# Patient Record
Sex: FEMALE | Race: WHITE | NOT HISPANIC OR LATINO | Employment: FULL TIME | URBAN - NONMETROPOLITAN AREA
[De-identification: names, ages, dates, MRNs, and addresses within clinical notes are randomized per-mention and may not be internally consistent; named-entity substitution may affect disease eponyms.]

---

## 2017-05-30 ENCOUNTER — OFFICE VISIT (OUTPATIENT)
Dept: CARDIOLOGY | Facility: CLINIC | Age: 27
End: 2017-05-30

## 2017-05-30 VITALS
WEIGHT: 169.4 LBS | HEIGHT: 60 IN | SYSTOLIC BLOOD PRESSURE: 113 MMHG | OXYGEN SATURATION: 98 % | HEART RATE: 83 BPM | DIASTOLIC BLOOD PRESSURE: 82 MMHG | BODY MASS INDEX: 33.26 KG/M2

## 2017-05-30 DIAGNOSIS — R07.2 PRECORDIAL PAIN: Primary | ICD-10-CM

## 2017-05-30 DIAGNOSIS — R00.2 PALPITATIONS: ICD-10-CM

## 2017-05-30 DIAGNOSIS — R06.02 SHORTNESS OF BREATH: ICD-10-CM

## 2017-05-30 PROCEDURE — 99204 OFFICE O/P NEW MOD 45 MIN: CPT | Performed by: PHYSICIAN ASSISTANT

## 2017-05-30 RX ORDER — LEVOTHYROXINE SODIUM 0.05 MG/1
50 TABLET ORAL DAILY
COMMUNITY

## 2017-05-30 RX ORDER — IBUPROFEN 800 MG/1
800 TABLET ORAL EVERY 8 HOURS PRN
COMMUNITY
End: 2017-06-28

## 2017-05-30 RX ORDER — CITALOPRAM 10 MG/1
10 TABLET ORAL DAILY
COMMUNITY

## 2017-06-05 NOTE — PROGRESS NOTES
"Edelmira LYNCH is a 27 y.o. female     Chief Complaint   Patient presents with   • Establish Care   • Chest Pain       HPI  The patient presents today for initial evaluation.  She presents because of chest pain.  The patient reports history of SVT and remote history of \"murmur\".  She has not had cardiac evaluation however in a number of years.  She presented to the ER recently with palpitations and chest pain.  She apparently had PACs by her monitor but no significant dysrhythmic activity otherwise.  Workup of her chest pain included routine labs as well as cardiac enzymes, EKG, and chest x-ray.  Those were all unremarkable.  She was empirically treated with ibuprofen to address any potential inflammatory etiology.  She has had some improvement in her chest pain since being on ibuprofen.  Her biggest issue at this time is now her palpitations.  She reports that occasionally her chest pain is associated with her palpitations.  She reports that she has a precordial and left parasternal aching sensation which has referral through to the mid back and to the left upper extremity.  Her palpitations tend to be more severe with episodes of chest pain.  She does get dyspneic but denies diaphoresis or nausea.  She can relate no exacerbating or alleviating circumstances otherwise.  She reports that her palpitations are brief irregularities and occasional tachycardic sensations.  She sometimes gets dizziness with this.  Regardless, given all the above, cardiac evaluation has now been requested.      Current Outpatient Prescriptions   Medication Sig Dispense Refill   • citalopram (CeleXA) 10 MG tablet Take 10 mg by mouth Daily.     • ibuprofen (ADVIL,MOTRIN) 800 MG tablet Take 800 mg by mouth Every 8 (Eight) Hours As Needed for Mild Pain (1-3).     • levothyroxine (SYNTHROID, LEVOTHROID) 50 MCG tablet Take 50 mcg by mouth Daily.     • Liraglutide (VICTOZA) 18 MG/3ML solution pen-injector Inject  under the skin Daily.   " "  • METFORMIN & DIET MANAGE PROD PO Take 1,500 mg by mouth Daily.       No current facility-administered medications for this visit.        Sulfa antibiotics and Zithromax [azithromycin]    Past Medical History:   Diagnosis Date   • Anxiety    • Arrhythmia     svt   • Diabetes mellitus    • Heart murmur    • Hypothyroidism        Social History     Social History   • Marital status:      Spouse name: N/A   • Number of children: N/A   • Years of education: N/A     Occupational History   • Not on file.     Social History Main Topics   • Smoking status: Never Smoker   • Smokeless tobacco: Not on file   • Alcohol use No   • Drug use: No   • Sexual activity: Not on file     Other Topics Concern   • Not on file     Social History Narrative   • No narrative on file       Family History   Problem Relation Age of Onset   • No Known Problems Mother    • No Known Problems Father    • No Known Problems Sister        Review of Systems   Constitutional: Positive for fatigue.   HENT: Negative.    Eyes: Positive for visual disturbance (contacts).   Respiratory: Positive for shortness of breath.    Cardiovascular: Positive for chest pain, palpitations and leg swelling (ankles).   Gastrointestinal: Positive for nausea.   Endocrine: Negative.    Genitourinary: Negative.    Musculoskeletal: Negative.    Skin: Negative.    Allergic/Immunologic: Negative.    Neurological: Positive for dizziness.   Hematological: Negative.    Psychiatric/Behavioral: Negative.        Objective     /82 (BP Location: Left arm, Patient Position: Sitting)  Pulse 83  Ht 60\" (152.4 cm)  Wt 169 lb 6.4 oz (76.8 kg)  SpO2 98%  BMI 33.08 kg/m2    Lab Results (most recent)     None          Physical Exam   Constitutional: She is oriented to person, place, and time. She appears well-developed and well-nourished. No distress.   HENT:   Head: Normocephalic and atraumatic.   Eyes: Conjunctivae and EOM are normal. Pupils are equal, round, and reactive to " light.   Neck: Normal range of motion. Neck supple. No JVD present. No tracheal deviation present.   Cardiovascular: Normal rate, regular rhythm, normal heart sounds and intact distal pulses.    Pulmonary/Chest: Effort normal and breath sounds normal.   Abdominal: Soft. Bowel sounds are normal. She exhibits no distension and no mass. There is no tenderness. There is no rebound and no guarding.   Musculoskeletal: Normal range of motion. She exhibits no edema, tenderness or deformity.   Neurological: She is alert and oriented to person, place, and time.   Skin: Skin is warm and dry. No rash noted. No erythema. No pallor.   Psychiatric: She has a normal mood and affect. Her behavior is normal. Judgment and thought content normal.   Nursing note and vitals reviewed.      Procedure   Procedures         Assessment/Plan      Diagnosis Plan   1. Precordial pain  ECG 12 Lead    Treadmill Stress Test    Adult Transthoracic Echo Complete    Cardiac Event Monitor    Treadmill Stress Test    Adult Transthoracic Echo Complete    Cardiac Event Monitor   2. Palpitations  ECG 12 Lead    Treadmill Stress Test    Adult Transthoracic Echo Complete    Cardiac Event Monitor    Treadmill Stress Test    Adult Transthoracic Echo Complete    Cardiac Event Monitor   3. Shortness of breath  ECG 12 Lead    Treadmill Stress Test    Adult Transthoracic Echo Complete    Cardiac Event Monitor    Treadmill Stress Test    Adult Transthoracic Echo Complete    Cardiac Event Monitor     Overall, the patient has atypical chest pain.  She has been empirically treated with ibuprofen to address any inflammatory etiology contributing to her symptoms.  Some characteristics of her chest pain are suspicious for chest wall discomfort however.  Still, we will schedule for regular treadmill stress test and an echo.  I'm going to continue ibuprofen at this time as she has had improvement in her pain with that therapy.  She has nothing, subjectively or objectively,  to suggest pericarditis.  We will also schedule for an event monitor given palpitations as above.  We'll see her back and discuss results of follow-up.  Should she have issues prior to follow-up, she will call to us.

## 2017-06-10 ENCOUNTER — OUTSIDE FACILITY SERVICE (OUTPATIENT)
Dept: CARDIOLOGY | Facility: CLINIC | Age: 27
End: 2017-06-10

## 2017-06-15 ENCOUNTER — OUTSIDE FACILITY SERVICE (OUTPATIENT)
Dept: CARDIOLOGY | Facility: CLINIC | Age: 27
End: 2017-06-15

## 2017-06-15 ENCOUNTER — HOSPITAL ENCOUNTER (OUTPATIENT)
Dept: CARDIOLOGY | Facility: HOSPITAL | Age: 27
Discharge: HOME | End: 2017-06-15

## 2017-06-15 ENCOUNTER — HOSPITAL ENCOUNTER (OUTPATIENT)
Dept: CARDIOLOGY | Facility: HOSPITAL | Age: 27
Discharge: HOME | End: 2017-06-15
Admitting: PHYSICIAN ASSISTANT

## 2017-06-15 LAB
MAXIMAL PREDICTED HEART RATE: 195 BPM
STRESS TARGET HR: 166 BPM

## 2017-06-15 PROCEDURE — 93018 CV STRESS TEST I&R ONLY: CPT | Performed by: INTERNAL MEDICINE

## 2017-06-15 PROCEDURE — 93306 TTE W/DOPPLER COMPLETE: CPT | Performed by: INTERNAL MEDICINE

## 2017-06-15 PROCEDURE — 93306 TTE W/DOPPLER COMPLETE: CPT

## 2017-06-15 PROCEDURE — 93272 ECG/REVIEW INTERPRET ONLY: CPT | Performed by: INTERNAL MEDICINE

## 2017-06-15 PROCEDURE — 93017 CV STRESS TEST TRACING ONLY: CPT

## 2017-06-21 ENCOUNTER — DOCUMENTATION (OUTPATIENT)
Dept: CARDIOLOGY | Facility: CLINIC | Age: 27
End: 2017-06-21

## 2017-06-21 NOTE — PROGRESS NOTES
Echo results back in the office, 3-6 f/u recommended. Patient has a f/u appt. Here at the office on 6-28-17. PH,LPN

## 2017-06-28 ENCOUNTER — OFFICE VISIT (OUTPATIENT)
Dept: CARDIOLOGY | Facility: CLINIC | Age: 27
End: 2017-06-28

## 2017-06-28 VITALS
HEART RATE: 75 BPM | DIASTOLIC BLOOD PRESSURE: 77 MMHG | SYSTOLIC BLOOD PRESSURE: 117 MMHG | BODY MASS INDEX: 33.61 KG/M2 | OXYGEN SATURATION: 96 % | WEIGHT: 171.2 LBS | HEIGHT: 60 IN

## 2017-06-28 DIAGNOSIS — R06.02 SHORTNESS OF BREATH: ICD-10-CM

## 2017-06-28 DIAGNOSIS — R07.9 CHEST PAIN, UNSPECIFIED TYPE: Primary | ICD-10-CM

## 2017-06-28 DIAGNOSIS — R00.2 PALPITATIONS: ICD-10-CM

## 2017-06-28 PROCEDURE — 99214 OFFICE O/P EST MOD 30 MIN: CPT | Performed by: PHYSICIAN ASSISTANT

## 2017-06-28 RX ORDER — METOPROLOL SUCCINATE 25 MG/1
25 TABLET, EXTENDED RELEASE ORAL DAILY
Qty: 30 TABLET | Refills: 11 | Status: SHIPPED | OUTPATIENT
Start: 2017-06-28 | End: 2017-06-28

## 2017-06-28 RX ORDER — METOPROLOL SUCCINATE 25 MG/1
12.5 TABLET, EXTENDED RELEASE ORAL DAILY
Qty: 30 TABLET | Refills: 11 | Status: SHIPPED | OUTPATIENT
Start: 2017-06-28 | End: 2017-07-03 | Stop reason: SDUPTHER

## 2017-06-28 NOTE — PROGRESS NOTES
Problem list     Subjective   Isela DUKES is a 27 y.o. female     Chief Complaint   Patient presents with   • Follow-up     presents as a follow up       HPI  ***      Outpatient Encounter Prescriptions as of 6/28/2017   Medication Sig Dispense Refill   • citalopram (CeleXA) 10 MG tablet Take 10 mg by mouth Daily.     • levothyroxine (SYNTHROID, LEVOTHROID) 50 MCG tablet Take 50 mcg by mouth Daily.     • Liraglutide (VICTOZA) 18 MG/3ML solution pen-injector Inject  under the skin Daily.     • METFORMIN & DIET MANAGE PROD PO Take 1,500 mg by mouth Daily.     • [DISCONTINUED] ibuprofen (ADVIL,MOTRIN) 800 MG tablet Take 800 mg by mouth Every 8 (Eight) Hours As Needed for Mild Pain (1-3).       No facility-administered encounter medications on file as of 6/28/2017.        Sulfa antibiotics and Zithromax [azithromycin]    Past Medical History:   Diagnosis Date   • Anxiety    • Arrhythmia     svt   • Diabetes mellitus    • Heart murmur    • Hypothyroidism        Social History     Social History   • Marital status:      Spouse name: N/A   • Number of children: N/A   • Years of education: N/A     Occupational History   • Not on file.     Social History Main Topics   • Smoking status: Never Smoker   • Smokeless tobacco: Not on file   • Alcohol use No   • Drug use: No   • Sexual activity: Not on file     Other Topics Concern   • Not on file     Social History Narrative       Family History   Problem Relation Age of Onset   • No Known Problems Mother    • No Known Problems Father    • No Known Problems Sister        Review of Systems   Constitutional: Positive for fatigue.   HENT: Positive for ear pain.    Eyes: Positive for visual disturbance (contacts).   Respiratory: Negative.    Cardiovascular: Positive for chest pain, palpitations and leg swelling (ankles).   Gastrointestinal: Positive for nausea.   Endocrine: Negative.    Genitourinary: Negative.    Musculoskeletal: Negative.    Skin: Negative.   "  Allergic/Immunologic: Negative.    Neurological: Positive for dizziness and light-headedness.   Hematological: Negative.    Psychiatric/Behavioral: Negative.        Objective     /77 (BP Location: Left arm, Patient Position: Sitting)  Pulse 75  Ht 60\" (152.4 cm)  Wt 171 lb 3.2 oz (77.7 kg)  SpO2 96%  BMI 33.44 kg/m2    Lab Results (most recent)     None          Physical Exam    Procedure   Procedures       Assessment/Plan     Problems Addressed this Visit     None                   "

## 2017-06-28 NOTE — PROGRESS NOTES
Problem list     Subjective   Isela DUKES is a 27 y.o. female     Chief Complaint   Patient presents with   • Follow-up     presents as a follow up       HPI    Patient is a 27-year-old female that presents back for follow-up. She is here today to follow-up on stress test, echocardiogram, and event monitor.    She describes having discomfort that she will feel substernally. This happens randomly with no exacerbating or relieving factors. She describes mild dyspnea when exerting but nothing that has been progressive. She denies PND orthopnea. She has good functional status. She does palpitate at times. She denies presyncope or syncope. Otherwise states she is doing well.    Stress test was indeterminate as it was clinically positive no EKG changes    Echocardiogram demonstrated normal systolic function, normal diastolic function, no significant valve lesions no effusion    Event monitor demonstrated sinus tachycardia but no sustained dysrhythmia      Outpatient Encounter Prescriptions as of 6/28/2017   Medication Sig Dispense Refill   • citalopram (CeleXA) 10 MG tablet Take 10 mg by mouth Daily.     • levothyroxine (SYNTHROID, LEVOTHROID) 50 MCG tablet Take 50 mcg by mouth Daily.     • Liraglutide (VICTOZA) 18 MG/3ML solution pen-injector Inject  under the skin Daily.     • METFORMIN & DIET MANAGE PROD PO Take 1,500 mg by mouth Daily.     • metoprolol succinate XL (TOPROL-XL) 25 MG 24 hr tablet Take 0.5 tablets by mouth Daily. 30 tablet 11   • [DISCONTINUED] ibuprofen (ADVIL,MOTRIN) 800 MG tablet Take 800 mg by mouth Every 8 (Eight) Hours As Needed for Mild Pain (1-3).     • [DISCONTINUED] metoprolol succinate XL (TOPROL-XL) 25 MG 24 hr tablet Take 1 tablet by mouth Daily. 30 tablet 11     No facility-administered encounter medications on file as of 6/28/2017.        Sulfa antibiotics and Zithromax [azithromycin]    Past Medical History:   Diagnosis Date   • Anxiety    • Arrhythmia     svt   • Diabetes mellitus    •  "Heart murmur    • Hypothyroidism        Social History     Social History   • Marital status:      Spouse name: N/A   • Number of children: N/A   • Years of education: N/A     Occupational History   • Not on file.     Social History Main Topics   • Smoking status: Never Smoker   • Smokeless tobacco: Not on file   • Alcohol use No   • Drug use: No   • Sexual activity: Not on file     Other Topics Concern   • Not on file     Social History Narrative       Family History   Problem Relation Age of Onset   • No Known Problems Mother    • No Known Problems Father    • No Known Problems Sister        Review of Systems   Constitutional: Positive for fatigue.   HENT: Negative.    Eyes: Negative.    Respiratory: Positive for shortness of breath.    Cardiovascular: Positive for chest pain and palpitations. Negative for leg swelling.   Gastrointestinal: Negative.    Endocrine: Negative.    Genitourinary: Negative.    Musculoskeletal: Negative.    Skin: Negative.    Allergic/Immunologic: Negative.    Neurological: Negative.    Hematological: Negative.    Psychiatric/Behavioral: Negative.        Objective     /77 (BP Location: Left arm, Patient Position: Sitting)  Pulse 75  Ht 60\" (152.4 cm)  Wt 171 lb 3.2 oz (77.7 kg)  SpO2 96%  BMI 33.44 kg/m2    Lab Results (most recent)     None          Physical Exam   Constitutional: She is oriented to person, place, and time. She appears well-developed and well-nourished. No distress.   HENT:   Head: Normocephalic and atraumatic.   Eyes: EOM are normal. Pupils are equal, round, and reactive to light.   Neck: No JVD present.   Cardiovascular: Normal rate, regular rhythm and normal heart sounds.  Exam reveals no gallop and no friction rub.    No murmur heard.  Pulmonary/Chest: Effort normal and breath sounds normal. No respiratory distress. She has no wheezes. She has no rales. She exhibits no tenderness.   Abdominal: Soft.   Musculoskeletal: Normal range of motion. She " exhibits no edema.   Neurological: She is alert and oriented to person, place, and time. No cranial nerve deficit.   Skin: Skin is warm and dry. No rash noted. No erythema. No pallor.   Psychiatric: She has a normal mood and affect. Her behavior is normal.   Nursing note and vitals reviewed.      Procedure   Procedures       Assessment/Plan     Problems Addressed this Visit        Cardiovascular and Mediastinum    Palpitations    Relevant Orders    Stress Test With Myocardial Perfusion One Day       Respiratory    Shortness of breath    Relevant Orders    Stress Test With Myocardial Perfusion One Day       Nervous and Auditory    Chest pain - Primary    Relevant Orders    Stress Test With Myocardial Perfusion One Day               Recommendation  1. Patient complains of tachycardia and on the event monitor had several episodes which she activated during sinus tachycardia. I feel much of her symptoms are likely related to palpitations. I would like to start a low-dose beta blocker to see if we can help suppress her tachycardia hopefully improve her symptoms.   2. Nuclear stress test will be performed because of abnormal regular treadmill stress test. Suspicion for coronary artery disease is low  3. We will see her back for follow-up of above testing. Follow-up primary as scheduled

## 2017-07-03 RX ORDER — METOPROLOL SUCCINATE 25 MG/1
12.5 TABLET, EXTENDED RELEASE ORAL DAILY
Qty: 30 TABLET | Refills: 11 | Status: SHIPPED | OUTPATIENT
Start: 2017-07-03 | End: 2017-10-23 | Stop reason: DRUGHIGH

## 2017-07-25 ENCOUNTER — HOSPITAL ENCOUNTER (OUTPATIENT)
Dept: CARDIOLOGY | Facility: HOSPITAL | Age: 27
Discharge: HOME | End: 2017-07-25

## 2017-07-25 ENCOUNTER — OUTSIDE FACILITY SERVICE (OUTPATIENT)
Dept: CARDIOLOGY | Facility: CLINIC | Age: 27
End: 2017-07-25

## 2017-07-25 LAB
MAXIMAL PREDICTED HEART RATE: 195 BPM
STRESS TARGET HR: 166 BPM

## 2017-07-25 PROCEDURE — 78452 HT MUSCLE IMAGE SPECT MULT: CPT | Performed by: INTERNAL MEDICINE

## 2017-07-25 PROCEDURE — A9500 TC99M SESTAMIBI: HCPCS | Performed by: INTERNAL MEDICINE

## 2017-07-25 PROCEDURE — 93018 CV STRESS TEST I&R ONLY: CPT | Performed by: INTERNAL MEDICINE

## 2017-07-25 PROCEDURE — 93017 CV STRESS TEST TRACING ONLY: CPT

## 2017-07-25 PROCEDURE — 0 TECHNETIUM SESTAMIBI: Performed by: INTERNAL MEDICINE

## 2017-07-25 PROCEDURE — 25010000002 REGADENOSON 0.4 MG/5ML SOLUTION: Performed by: INTERNAL MEDICINE

## 2017-07-25 PROCEDURE — 78452 HT MUSCLE IMAGE SPECT MULT: CPT

## 2017-07-25 RX ADMIN — TECHNETIUM TC-99M SESTAMIBI 1 DOSE: 1 INJECTION INTRAVENOUS at 13:45

## 2017-07-25 RX ADMIN — REGADENOSON 0.4 MG: 0.08 INJECTION, SOLUTION INTRAVENOUS at 13:45

## 2017-07-26 ENCOUNTER — DOCUMENTATION (OUTPATIENT)
Dept: CARDIOLOGY | Facility: CLINIC | Age: 27
End: 2017-07-26

## 2017-10-23 ENCOUNTER — OFFICE VISIT (OUTPATIENT)
Dept: CARDIOLOGY | Facility: CLINIC | Age: 27
End: 2017-10-23

## 2017-10-23 VITALS
DIASTOLIC BLOOD PRESSURE: 73 MMHG | HEART RATE: 78 BPM | WEIGHT: 175 LBS | SYSTOLIC BLOOD PRESSURE: 113 MMHG | HEIGHT: 60 IN | BODY MASS INDEX: 34.36 KG/M2 | OXYGEN SATURATION: 100 %

## 2017-10-23 DIAGNOSIS — R00.2 PALPITATIONS: ICD-10-CM

## 2017-10-23 DIAGNOSIS — R06.02 SHORTNESS OF BREATH: ICD-10-CM

## 2017-10-23 DIAGNOSIS — R07.2 PRECORDIAL PAIN: Primary | ICD-10-CM

## 2017-10-23 PROCEDURE — 99213 OFFICE O/P EST LOW 20 MIN: CPT | Performed by: INTERNAL MEDICINE

## 2017-10-23 PROCEDURE — 93000 ELECTROCARDIOGRAM COMPLETE: CPT | Performed by: INTERNAL MEDICINE

## 2017-10-23 RX ORDER — METOPROLOL SUCCINATE 25 MG/1
25 TABLET, EXTENDED RELEASE ORAL DAILY
Qty: 30 TABLET | Refills: 6 | Status: SHIPPED | OUTPATIENT
Start: 2017-10-23

## 2017-10-23 NOTE — PROGRESS NOTES
Subjective   Isela CHAMBERS is a 27 y.o. female     Chief Complaint   Patient presents with   • Follow-up     patient appears in office today for follow up test results (stress/holter)   • Chest Pain     patient states she is still having midsternal CP only that is quick acting and resolves immediatley    • Shortness of Breath     patient denies any SOA since last OV   • Palpitations     patient denies any palpitations since last OV       PROBLEM LIST:     Specialty Problems        Cardiology Problems    Palpitations                HPI: Problem List:  1. Chest Pain.   1.1 Treadmill Stress. 06/15/17. Indeterminate Risk due to Chest pain at Baseline. Poor to below average exercise capacity.   1.2 Nuclear Stress Test. 07/25/17. No Ischemia. Low Risk.   2. Shortness of Breath.  2.1 Echo. EF greater than 65%. Trace MR.   3. Palpitations.   Ms. Chambers returns for follow-up of test results.    She has had significant improvement on metoprolol but still palpitates and has other symptoms previously described.  However, she feels these are much less frequent, intense, and prolonged.    Stress test demonstrated no scintigraphic evidence of ischemia with preserved ejection fraction.  Event monitor demonstrated a sinus mechanism throughout with normal sinus rhythm or sinus tachycardia during all of 64 recorded symptomatic episodes.      CURRENT MEDICATION:    Current Outpatient Prescriptions   Medication Sig Dispense Refill   • citalopram (CeleXA) 10 MG tablet Take 10 mg by mouth Daily.     • levothyroxine (SYNTHROID, LEVOTHROID) 50 MCG tablet Take 50 mcg by mouth Daily.     • Liraglutide (VICTOZA) 18 MG/3ML solution pen-injector Inject 1.8 mg under the skin Daily.     • METFORMIN & DIET MANAGE PROD PO Take 1,500 mg by mouth Daily.     • metoprolol succinate XL (TOPROL-XL) 25 MG 24 hr tablet Take 1 tablet by mouth Daily. 30 tablet 6     No current facility-administered medications for this visit.        ALLERGIES:    Sulfa  antibiotics and Zithromax [azithromycin]    PAST MEDICAL HISTORY:    Past Medical History:   Diagnosis Date   • Anxiety    • Arrhythmia     svt   • Diabetes mellitus    • Heart murmur    • Hypothyroidism        SURGICAL HISTORY:    Past Surgical History:   Procedure Laterality Date   • NO PAST SURGERIES         SOCIAL HISTORY:    Social History     Social History   • Marital status:      Spouse name: N/A   • Number of children: N/A   • Years of education: N/A     Occupational History   • Not on file.     Social History Main Topics   • Smoking status: Never Smoker   • Smokeless tobacco: Never Used   • Alcohol use No   • Drug use: No   • Sexual activity: Defer     Other Topics Concern   • Not on file     Social History Narrative       FAMILY HISTORY:    Family History   Problem Relation Age of Onset   • No Known Problems Mother    • No Known Problems Father    • No Known Problems Sister        Review of Systems   Constitutional: Negative.  Negative for fatigue.   HENT: Negative.  Negative for congestion, rhinorrhea, sinus pain, sinus pressure and sneezing.    Eyes: Positive for visual disturbance (wears glasses daily).   Respiratory: Negative.  Negative for cough, chest tightness, shortness of breath (denies SOA) and wheezing.    Cardiovascular: Positive for chest pain (midsternal CP; very quick acting and resolves immediatley ). Negative for palpitations (denies palpitations) and leg swelling.   Gastrointestinal: Negative.  Negative for abdominal pain, nausea and vomiting.   Endocrine: Negative.  Negative for cold intolerance, heat intolerance, polyphagia and polyuria.   Genitourinary: Negative.  Negative for difficulty urinating, frequency and urgency.   Musculoskeletal: Negative.  Negative for arthralgias, back pain, myalgias, neck pain and neck stiffness.   Skin: Negative.  Negative for rash and wound.   Allergic/Immunologic: Negative.  Negative for environmental allergies and food allergies.  "  Neurological: Negative.  Negative for dizziness, weakness, light-headedness and headaches.   Hematological: Negative.  Does not bruise/bleed easily.   Psychiatric/Behavioral: Negative for agitation, confusion and sleep disturbance (denies waking up smothering/SOA). The patient is not nervous/anxious.        VISIT VITALS:    /73 (BP Location: Left arm, Patient Position: Sitting)  Pulse 78  Ht 60\" (152.4 cm)  Wt 175 lb (79.4 kg)  SpO2 100%  BMI 34.18 kg/m2    RECENT LABS:    Objective       Physical Exam   Constitutional: She is oriented to person, place, and time. She appears well-developed and well-nourished.   HENT:   Head: Normocephalic and atraumatic.   Musculoskeletal: Normal range of motion.   Neurological: She is alert and oriented to person, place, and time.   Skin: Skin is warm and dry.   Psychiatric: She has a normal mood and affect. Her behavior is normal. Judgment and thought content normal.   Nursing note and vitals reviewed.        ECG 12 Lead  Date/Time: 10/23/2017 10:31 AM  Performed by: RADHA FRIAS  Authorized by: RADHA FRIAS   Rhythm: sinus rhythm  Clinical impression: normal ECG  Comments: CP              Assessment/Plan #1.  I think the best explanation for the patient's symptoms, particularly given her positive response to beta blocker therapy, is inappropriate sinus tachycardia.    #2.  Therefore, I think it would be reasonable to honor the patient's request to trial in increased dose of metoprolol 25 mg.  The patient was given precautions reference symptomatic hypotension.    #3.  Ms. Chambers will follow-up with Gabbi Barnett as instructed, and in our office on a yearly basis, or on a when necessary basis worse symptoms or concerns as discussed today.     Diagnosis Plan   1. Precordial pain  ECG 12 Lead   2. Palpitations  metoprolol succinate XL (TOPROL-XL) 25 MG 24 hr tablet   3. Shortness of breath         Return in about 1 year (around 10/23/2018) for Annual " physical.       Scribed for Dr. Connor Cosme by Nichole Dutton LPN October 23, 2017 11:04 AM   Connor Cosme MD

## 2021-03-23 ENCOUNTER — BULK ORDERING (OUTPATIENT)
Dept: CASE MANAGEMENT | Facility: OTHER | Age: 31
End: 2021-03-23

## 2021-03-23 DIAGNOSIS — Z23 IMMUNIZATION DUE: ICD-10-CM

## 2021-04-25 NOTE — PROGRESS NOTES
Patient aware of stress test results and VIJAY Camilo scheduling f/u appt. PH,LPN   Statement Selected